# Patient Record
Sex: MALE | Race: WHITE | NOT HISPANIC OR LATINO | URBAN - METROPOLITAN AREA
[De-identification: names, ages, dates, MRNs, and addresses within clinical notes are randomized per-mention and may not be internally consistent; named-entity substitution may affect disease eponyms.]

---

## 2017-05-05 ENCOUNTER — IMPORTED ENCOUNTER (OUTPATIENT)
Dept: URBAN - METROPOLITAN AREA CLINIC 1 | Facility: CLINIC | Age: 72
End: 2017-05-05

## 2017-05-05 PROBLEM — H16.143: Noted: 2017-05-05

## 2017-05-05 PROBLEM — H25.813: Noted: 2017-05-05

## 2017-05-05 PROBLEM — H04.123: Noted: 2017-05-05

## 2017-05-05 PROBLEM — H35.372: Noted: 2017-05-05

## 2017-05-05 PROBLEM — H40.033: Noted: 2017-05-05

## 2017-05-05 PROBLEM — H43.812: Noted: 2017-05-05

## 2017-05-05 PROCEDURE — 92015 DETERMINE REFRACTIVE STATE: CPT

## 2017-05-05 PROCEDURE — 92012 INTRM OPH EXAM EST PATIENT: CPT

## 2017-05-05 NOTE — PATIENT DISCUSSION
1.  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 Refer to retina specialists (Dr. Napoleon Blackwell) for treatment. 2.  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 RD precautions. 3.  Cataract OU: Progressed Observe for now without intervention. The patient was advised to contact us if any change or worsening of vision4. PAIGE w/ PEK OU- Stable. The continuation of artificial tears were recommended. 5.  Narrow Angles OU: Symptoms of angle closure. Observe w/o intervention. 6.  RTC as scheduled for a 30/glare in September w/ PMG.

## 2017-10-12 ENCOUNTER — IMPORTED ENCOUNTER (OUTPATIENT)
Dept: URBAN - METROPOLITAN AREA CLINIC 1 | Facility: CLINIC | Age: 72
End: 2017-10-12

## 2017-10-12 PROBLEM — H43.812: Noted: 2017-10-12

## 2017-10-12 PROBLEM — H40.033: Noted: 2017-10-12

## 2017-10-12 PROBLEM — H25.813: Noted: 2017-10-12

## 2017-10-12 PROBLEM — H35.372: Noted: 2017-10-12

## 2017-10-12 PROCEDURE — 92015 DETERMINE REFRACTIVE STATE: CPT

## 2017-10-12 PROCEDURE — 92014 COMPRE OPH EXAM EST PT 1/>: CPT

## 2017-10-12 NOTE — PATIENT DISCUSSION
1.  Cataract OU:  Visually Significant discussed the risks benefits alternatives and limitations of cataract surgery. The patient stated a full understanding and a desire to proceed with the procedure. The patient will need to return for preop appointment with cataract measurements and to have any additional questions answered and start pre-operative eye drops as directed. Discussed need for Phaco for Narrow angles also. Phaco PCL OS then ODOtherwise follow-up2. Narrow Angles OU Occludible. Will defer Yag PI second to scheduling Phaco.  Advised if pt holds off on phaco he will need to schedule Yag PI Ou.  3.  Trace Epiretinal Membrane OS - (Pt followed by Dr. Syeda Combs. PVD w/o Tear OS - Patient was cautioned to call our office immediately if they experience   a substantial change in their symptoms such as an increase in floaters persistent flashes loss of visual field (may appear as a shadow or a curtain) or decrease in visual acuity as these may indicate a retinal tear or detachment. 5.  Return for an appointment for Phaco OS then OD with Dr. Redd Carreon.

## 2017-11-03 ENCOUNTER — IMPORTED ENCOUNTER (OUTPATIENT)
Dept: URBAN - METROPOLITAN AREA CLINIC 1 | Facility: CLINIC | Age: 72
End: 2017-11-03

## 2017-11-03 PROCEDURE — 66761 REVISION OF IRIS: CPT

## 2017-11-03 NOTE — PATIENT DISCUSSION
Iridotomy OS: (Consent signed and scanned into attachments) 1 gtt Prolensa applied. The purpose and nature of the procedure possible alternative methods of treatment the risks involved and the possibility of complications were discussed with patient. The Patient wishes to proceed and the consent was signed. The laser was then performed under topical anesthesia with no complications. Post op instructions were given to patient as well as a follow-up appointment. Patient was advised to call our office if any questions or concerns. Begin Lotemax BID OS till samples out Begin Combigan BID OS till sample out.  (samples of both gtts given)

## 2017-11-17 ENCOUNTER — IMPORTED ENCOUNTER (OUTPATIENT)
Dept: URBAN - METROPOLITAN AREA CLINIC 1 | Facility: CLINIC | Age: 72
End: 2017-11-17

## 2017-11-17 PROCEDURE — 66761 REVISION OF IRIS: CPT

## 2017-11-17 NOTE — PATIENT DISCUSSION
Iridotomy OD: (Consent signed and scanned into attachments) 1 gtt Prolensa applied. The purpose and nature of the procedure possible alternative methods of treatment the risks involved and the possibility of complications were discussed with patient. The Patient wishes to proceed and the consent was signed. The laser was then performed under topical anesthesia with no complications. Post op instructions were given to patient as well as a follow-up appointment. Patient was advised to call our office if any questions or concerns. Start Lotemax BID OD till samples out start Combigan BID OD till samples out.  RTC 2-4 week YAG PI PO

## 2017-11-18 PROBLEM — H40.031: Noted: 2017-11-18

## 2017-12-19 ENCOUNTER — IMPORTED ENCOUNTER (OUTPATIENT)
Dept: URBAN - METROPOLITAN AREA CLINIC 1 | Facility: CLINIC | Age: 72
End: 2017-12-19

## 2017-12-19 PROBLEM — H40.033: Noted: 2017-12-19

## 2017-12-19 PROCEDURE — 99024 POSTOP FOLLOW-UP VISIT: CPT

## 2017-12-19 NOTE — PATIENT DISCUSSION
ADY HAMPTON OU : Good ResultReturn for an appointment for a 30/glare in October with Dr. Beatriz Garzon.

## 2022-04-02 ASSESSMENT — VISUAL ACUITY
OS_PH: SC 20/25
OD_SC: 20/20
OD_SC: 20/20
OS_GLARE: 20/50
OS_PH: SC 20/50
OS_SC: 20/40
OS_SC: 20/70
OS_SC: 20/40
OD_GLARE: 20/50
OD_SC: 20/20

## 2022-04-02 ASSESSMENT — TONOMETRY
OD_IOP_MMHG: 14
OS_IOP_MMHG: 15
OD_IOP_MMHG: 16
OS_IOP_MMHG: 16
OD_IOP_MMHG: 15
OS_IOP_MMHG: 14